# Patient Record
Sex: MALE | Race: WHITE | URBAN - METROPOLITAN AREA
[De-identification: names, ages, dates, MRNs, and addresses within clinical notes are randomized per-mention and may not be internally consistent; named-entity substitution may affect disease eponyms.]

---

## 2019-09-04 ENCOUNTER — HISTORICAL (OUTPATIENT)
Dept: PREADMISSION TESTING | Facility: HOSPITAL | Age: 74
End: 2019-09-04

## 2019-09-04 LAB
ABS NEUT (OLG): 2.54 X10(3)/MCL (ref 2.1–9.2)
ALBUMIN SERPL-MCNC: 3.7 GM/DL (ref 3.4–5)
ALBUMIN/GLOB SERPL: 1.2 RATIO (ref 1.1–2)
ALP SERPL-CCNC: 45 UNIT/L (ref 50–136)
ALT SERPL-CCNC: 30 UNIT/L (ref 12–78)
APPEARANCE, UA: CLEAR
AST SERPL-CCNC: 38 UNIT/L (ref 15–37)
BACTERIA SPEC CULT: NORMAL /HPF
BASOPHILS # BLD AUTO: 0.1 X10(3)/MCL (ref 0–0.2)
BASOPHILS NFR BLD AUTO: 1 %
BILIRUB SERPL-MCNC: 0.7 MG/DL (ref 0.2–1)
BILIRUB UR QL STRIP: NEGATIVE
BILIRUBIN DIRECT+TOT PNL SERPL-MCNC: 0.2 MG/DL (ref 0–0.5)
BILIRUBIN DIRECT+TOT PNL SERPL-MCNC: 0.5 MG/DL (ref 0–0.8)
BUN SERPL-MCNC: 31 MG/DL (ref 7–18)
CALCIUM SERPL-MCNC: 8.9 MG/DL (ref 8.5–10.1)
CHLORIDE SERPL-SCNC: 106 MMOL/L (ref 98–107)
CO2 SERPL-SCNC: 30 MMOL/L (ref 21–32)
COLOR UR: YELLOW
CREAT SERPL-MCNC: 1.44 MG/DL (ref 0.7–1.3)
EOSINOPHIL # BLD AUTO: 0.1 X10(3)/MCL (ref 0–0.9)
EOSINOPHIL NFR BLD AUTO: 2 %
ERYTHROCYTE [DISTWIDTH] IN BLOOD BY AUTOMATED COUNT: 13.6 % (ref 11.5–17)
GLOBULIN SER-MCNC: 3 GM/DL (ref 2.4–3.5)
GLUCOSE (UA): NEGATIVE
GLUCOSE SERPL-MCNC: 88 MG/DL (ref 74–106)
HCT VFR BLD AUTO: 41.2 % (ref 42–52)
HGB BLD-MCNC: 13.4 GM/DL (ref 14–18)
HGB UR QL STRIP: NEGATIVE
KETONES UR QL STRIP: NEGATIVE
LEUKOCYTE ESTERASE UR QL STRIP: NEGATIVE
LYMPHOCYTES # BLD AUTO: 1.8 X10(3)/MCL (ref 0.6–4.6)
LYMPHOCYTES NFR BLD AUTO: 35 %
MCH RBC QN AUTO: 32.8 PG (ref 27–31)
MCHC RBC AUTO-ENTMCNC: 32.5 GM/DL (ref 33–36)
MCV RBC AUTO: 100.7 FL (ref 80–94)
MONOCYTES # BLD AUTO: 0.6 X10(3)/MCL (ref 0.1–1.3)
MONOCYTES NFR BLD AUTO: 13 %
NEUTROPHILS # BLD AUTO: 2.54 X10(3)/MCL (ref 2.1–9.2)
NEUTROPHILS NFR BLD AUTO: 49 %
NITRITE UR QL STRIP: NEGATIVE
PH UR STRIP: 5.5 [PH] (ref 5–9)
PLATELET # BLD AUTO: 212 X10(3)/MCL (ref 130–400)
PMV BLD AUTO: 10.5 FL (ref 9.4–12.4)
POTASSIUM SERPL-SCNC: 5 MMOL/L (ref 3.5–5.1)
PROT SERPL-MCNC: 6.7 GM/DL (ref 6.4–8.2)
PROT UR QL STRIP: NEGATIVE
RBC # BLD AUTO: 4.09 X10(6)/MCL (ref 4.7–6.1)
RBC #/AREA URNS HPF: NORMAL /[HPF]
SODIUM SERPL-SCNC: 140 MMOL/L (ref 136–145)
SP GR UR STRIP: 1.02 (ref 1–1.03)
SQUAMOUS EPITHELIAL, UA: NORMAL
UROBILINOGEN UR STRIP-ACNC: 0.2
WBC # SPEC AUTO: 5.1 X10(3)/MCL (ref 4.5–11.5)
WBC #/AREA URNS HPF: NORMAL /HPF

## 2019-09-05 ENCOUNTER — HISTORICAL (OUTPATIENT)
Dept: ADMINISTRATIVE | Facility: HOSPITAL | Age: 74
End: 2019-09-05

## 2020-06-23 ENCOUNTER — HISTORICAL (OUTPATIENT)
Dept: ADMINISTRATIVE | Facility: HOSPITAL | Age: 75
End: 2020-06-23

## 2022-04-10 ENCOUNTER — HISTORICAL (OUTPATIENT)
Dept: ADMINISTRATIVE | Facility: HOSPITAL | Age: 77
End: 2022-04-10

## 2022-04-24 VITALS
BODY MASS INDEX: 21.66 KG/M2 | DIASTOLIC BLOOD PRESSURE: 64 MMHG | HEIGHT: 65 IN | WEIGHT: 130 LBS | SYSTOLIC BLOOD PRESSURE: 120 MMHG

## 2022-04-29 NOTE — OP NOTE
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    OPERATION:    1. 11443, excision mass posterior aspect right ear with submission to lab for histological frozen section.    2. 14060, advancement flap closure of posterior aspect right ear.    DESCRIPTION OF PROCEDURE:  The patient was prepped and draped in the usual manner, after light sedation and anesthesia/analgesia given, by the Anesthesia Department.  His right ear was anesthetized with xylocaine and Marcaine to which Adrenalin had been added.  This patient previously had had a large wedge resection for a sarcoma of the right ear and he came back recently with a complaint of a small, painful mass of the posterior aspect of the ear, directly in the line of the posterior excision.  I had previously carried out a full-thickness wedge resection of a large segment of the ear.  This looked benign, but he was quite worried about it and I thought the best thing to do would be to take this off.  We then carried out an elliptical excision of the soft tissue overlying the cord, going down to the cartilage in the perichondrium.  The cartilage looked all right.  We sent the specimen to the laboratory for histologic frozen section, which came back as no carcinoma or sarcoma, but there was some actinic keratoses.  I then closed the flaps in multiple layers utilizing 4-0 interrupted mattress Vicryl.  A compression dressing was applied.  The patient went to the recovery room in good condition.        ______________________________  Ortega Rivera MD    [facility]  DD:  09/05/2019  Time:  01:16PM  DT:  09/05/2019  Time:  01:39PM  Job #:  [id number]

## 2022-04-29 NOTE — OP NOTE
DATE OF SURGERY:    06/23/2020    SURGEON:  Gorge Foster MD  ASSISTANT:  ASHWIN Thomas    PREOPERATIVE DIAGNOSES:    1. Conjunctival lesion suspicious for VIRGEN, right eye.  2. Central corneal opacity, right eye.    POSTOPERATIVE DIAGNOSES:    1. Conjunctival lesion suspicious for VIRGEN, right eye.  2. Central corneal opacity, right eye.    PROCEDURE:    1. Conjunctival lesion excision 12 mm x 12 mm, right eye.  2. Cryotherapy, right eye.  3. Ocular surface reconstruction, right eye.  4. Temporary tarsorrhaphy, right eye.    ANESTHESIA:  Topical.    COMPLICATIONS:  None.    DESCRIPTION OF PROCEDURE:  After careful informed consent was obtained, the patient was brought to the operating room where he was correctly identified as the operative eye via time-out.  The patient was then prepped and draped in sterile ophthalmic fashion.  A lid speculum was placed in the right eye, and an operating microscope was brought into place.  Using a 7-0 Vicryl suture, bridle sutures were fashioned at the limbus at 3 and 9 o'clock.  The eye was rotated inferiorly.  The conjunctival lesion was identified.  It was highly suspicious for VIRGEN.  It measured 12 mm x 12 mm extending onto the limbus and partially onto the cornea.  Using a calipers, 2 mm borders were fashioned outside of the limbus.  Using a 1 touch technique, the conjunctival lesion was excised using 0.12 forceps and Reji scissors.  The lesion was removed at the limbus using a crescent blade.  The cornea was debrided with a 57 blade.  The conjunctival lesion excision site was treated with using a triple freeze/thaw technique, was treated with liquid nitrogen.  An amniotic membrane graft was brought over the patient's eye.  The amniotic membrane graft was glued over the conjunctival lesion excision site using Tisseel glue.  The redundant glue was excised.  Another piece of amniotic membrane graft was then placed over the cornea.  It was sutured into place using 4  interrupted 7-0 Vicryl sutures.  Next, a temporary tarsorrhaphy was fashioned using bolsters     for the upper and lower lids with a double-arm 6-0 Prolene.  The drapes were removed.  Topical antibiotics were applied to the eye.  The patient was brought to the PACU in good condition.        ______________________________  Gorge Foster MD    S/  DD:  06/23/2020  Time:  02:21PM  DT:  06/23/2020  Time:  03:58PM  Job #:  [id number]